# Patient Record
Sex: MALE | Race: WHITE | URBAN - METROPOLITAN AREA
[De-identification: names, ages, dates, MRNs, and addresses within clinical notes are randomized per-mention and may not be internally consistent; named-entity substitution may affect disease eponyms.]

---

## 2018-03-28 ENCOUNTER — RADIANT APPOINTMENT (OUTPATIENT)
Dept: GENERAL RADIOLOGY | Facility: CLINIC | Age: 23
End: 2018-03-28
Attending: INTERNAL MEDICINE
Payer: COMMERCIAL

## 2018-03-28 ENCOUNTER — OFFICE VISIT (OUTPATIENT)
Dept: URGENT CARE | Facility: URGENT CARE | Age: 23
End: 2018-03-28
Payer: COMMERCIAL

## 2018-03-28 ENCOUNTER — NURSE TRIAGE (OUTPATIENT)
Dept: NURSING | Facility: CLINIC | Age: 23
End: 2018-03-28

## 2018-03-28 VITALS
TEMPERATURE: 98.8 F | HEART RATE: 88 BPM | DIASTOLIC BLOOD PRESSURE: 76 MMHG | SYSTOLIC BLOOD PRESSURE: 114 MMHG | OXYGEN SATURATION: 99 %

## 2018-03-28 DIAGNOSIS — R07.89 ATYPICAL CHEST PAIN: ICD-10-CM

## 2018-03-28 DIAGNOSIS — R07.89 ATYPICAL CHEST PAIN: Primary | ICD-10-CM

## 2018-03-28 PROCEDURE — 99204 OFFICE O/P NEW MOD 45 MIN: CPT | Performed by: INTERNAL MEDICINE

## 2018-03-28 PROCEDURE — 93000 ELECTROCARDIOGRAM COMPLETE: CPT | Performed by: INTERNAL MEDICINE

## 2018-03-28 PROCEDURE — 71046 X-RAY EXAM CHEST 2 VIEWS: CPT

## 2018-03-28 RX ORDER — NAPROXEN 500 MG/1
500 TABLET ORAL 2 TIMES DAILY WITH MEALS
Qty: 60 TABLET | Refills: 0 | Status: SHIPPED | OUTPATIENT
Start: 2018-03-28

## 2018-03-28 ASSESSMENT — ENCOUNTER SYMPTOMS
SORE THROAT: 0
RHINORRHEA: 1
EYE REDNESS: 0
PALPITATIONS: 0
VOMITING: 0
SHORTNESS OF BREATH: 0
ARTHRALGIAS: 0
WHEEZING: 0
HEMATURIA: 0
CHILLS: 0
CHEST TIGHTNESS: 1
BRUISES/BLEEDS EASILY: 0
FEVER: 0
DIZZINESS: 0
EYE DISCHARGE: 0
HEADACHES: 1
DYSURIA: 0
ABDOMINAL PAIN: 0

## 2018-03-28 NOTE — TELEPHONE ENCOUNTER
"Clinic Action Needed:No  Reason for Call: \"I was told by my PCP back home in [place] to be seen at an Urgent Care before flying home tomorrow\".  Caller was looking for UC information, location and hours.  Info provided.   Routed to: Not routed.    Dee Sanchez RN  [facility]        "

## 2018-03-28 NOTE — MR AVS SNAPSHOT
After Visit Summary   3/28/2018    Den Rivera    MRN: [id number]           Patient Information     Date Of Birth          1995        Visit Information        Provider Department      3/28/2018 6:50 PM Munira Hylton MD [facility]        Today's Diagnoses     Atypical chest pain    -  1      Care Instructions      chest x-ray & EKG normal     Vitals, oxygen levels and exam are normal     Please start naprosyn 2 x day as may have constrochondritis  Also stress reduction  exercise    Plan recheck over next week with primary            *CHEST PAIN, NONCARDIAC    Based on your visit today, the exact cause of your chest pain is not certain. Your condition does not seem serious and your pain does not appear to be coming from your heart. However, sometimes the signs of a serious problem take more time to appear. Therefore, please watch for the warning signs listed below.  HOME CARE:  1. Rest today and avoid strenuous activity.  2. Take any prescribed medicine as directed.  FOLLOW UP with your doctor in 1-3 days.   GET PROMPT MEDICAL ATTENTION if any of the following occur:    A change in the type of pain: if it feels different, becomes more severe, lasts longer, or begins to spread into your shoulder, arm, neck, jaw or back    Shortness of breath or increased pain with breathing    Cough with blood or dark colored sputum (phlegm)    Weakness, dizziness, or fainting    Fever over 101  F (38.3  C)    Swelling, pain or redness in one leg    1656-5661 [employer]. [address] [place] [address]. All rights reserved. This information is not intended as a substitute for professional medical care. Always follow your healthcare professional's instructions.  This information has been modified by your health care provider with permission from the publisher.            Follow-ups after your visit        Who to contact     If you have questions or need  "follow up information about today's clinic visit or your schedule please contact [facility] directly at [phone].  Normal or non-critical lab and imaging results will be communicated to you by MyChart, letter or phone within 4 business days after the clinic has received the results. If you do not hear from us within 7 days, please contact the clinic through [employer] or phone. If you have a critical or abnormal lab result, we will notify you by phone as soon as possible.  Submit refill requests through [employer] or call your pharmacy and they will forward the refill request to us. Please allow 3 business days for your refill to be completed.          Additional Information About Your Visit        [employer]ave [employer] Information     [employer] lets you send messages to your doctor, view your test results, renew your prescriptions, schedule appointments and more. To sign up, go to [email or web address] . Click on \"Log in\" on the left side of the screen, which will take you to the Welcome page. Then click on \"Sign up Now\" on the right side of the page.     You will be asked to enter the access code listed below, as well as some personal information. Please follow the directions to create your username and password.     Your access code is: [email or web address]  Expires: 2018  8:50 PM     Your access code will  in 90 days. If you need help or a new code, please call your [place] clinic or [phone].        Care EveryWhere ID     This is your Care EveryWhere ID. This could be used by other organizations to access your [place] medical records  [id number]        Your Vitals Were     Pulse Temperature Pulse Oximetry             88 98.8  F (37.1  C) (Oral) 99%          Blood Pressure from Last 3 Encounters:   18 114/76    Weight from Last 3 Encounters:   No data found for Wt              We Performed the Following     EKG 12-lead complete w/read - Clinics          Today's Medication Changes        "   These changes are accurate as of 3/28/18  8:50 PM.  If you have any questions, ask your nurse or doctor.               Start taking these medicines.        Dose/Directions    naproxen 500 MG tablet   Commonly known as:  NAPROSYN   Used for:  Atypical chest pain   Started by:  Munira Hylton MD        Dose:  500 mg   Take 1 tablet (500 mg) by mouth 2 times daily (with meals)   Quantity:  60 tablet   Refills:  0            Where to get your medicines      Some of these will need a paper prescription and others can be bought over the counter.  Ask your nurse if you have questions.     Bring a paper prescription for each of these medications     naproxen 500 MG tablet                Primary Care Provider Fax #    Physician No Ref-Primary [phone]       No address on file        Equal Access to Services     VERÓNICA CODY : Kirill Adrian, wamayo fajardo, qaybta kaalmada adecherrieyanathan, gaby villarreal . [facility] [phone].    ATENCIÓN: Si habla español, tiene a lomeli disposición servicios gratuitos de asistencia lingüística. [facility] [phone].    We comply with applicable federal civil rights laws and [place] laws. We do not discriminate on the basis of race, color, national origin, age, disability, sex, sexual orientation, or gender identity.            Thank you!     Thank you for choosing [facility]  for your care. Our goal is always to provide you with excellent care. Hearing back from our patients is one way we can continue to improve our services. Please take a few minutes to complete the written survey that you may receive in the mail after your visit with us. Thank you!             Your Updated Medication List - Protect others around you: Learn how to safely use, store and throw away your medicines at www.disposemymeds.org.          This list is accurate as of 3/28/18  8:50 PM.  Always use your most recent med list.                    Brand Name Dispense Instructions for use Diagnosis    naproxen 500 MG tablet    NAPROSYN    60 tablet    Take 1 tablet (500 mg) by mouth 2 times daily (with meals)    Atypical chest pain       OMEPRAZOLE PO

## 2018-03-29 NOTE — PATIENT INSTRUCTIONS
chest x-ray & EKG normal     Vitals, oxygen levels and exam are normal     Please start naprosyn 2 x day as may have constrochondritis  Also stress reduction  exercise    Plan recheck over next week with primary            *CHEST PAIN, NONCARDIAC    Based on your visit today, the exact cause of your chest pain is not certain. Your condition does not seem serious and your pain does not appear to be coming from your heart. However, sometimes the signs of a serious problem take more time to appear. Therefore, please watch for the warning signs listed below.  HOME CARE:  1. Rest today and avoid strenuous activity.  2. Take any prescribed medicine as directed.  FOLLOW UP with your doctor in 1-3 days.   GET PROMPT MEDICAL ATTENTION if any of the following occur:    A change in the type of pain: if it feels different, becomes more severe, lasts longer, or begins to spread into your shoulder, arm, neck, jaw or back    Shortness of breath or increased pain with breathing    Cough with blood or dark colored sputum (phlegm)    Weakness, dizziness, or fainting    Fever over 101  F (38.3  C)    Swelling, pain or redness in one leg    7815-1905 [employer]. [address] [place] [id number]. All rights reserved. This information is not intended as a substitute for professional medical care. Always follow your healthcare professional's instructions.  This information has been modified by your health care provider with permission from the publisher.

## 2018-03-29 NOTE — NURSING NOTE
Chief Complaint   Patient presents with     Urgent Care     Triage     Earlier today while sitting down suddenly had very intense sharp pain in left anterior rib area, lasting a couple seconds, followed by less intense pain for the next few minutes.  Also felt some transient sharp pain under right ribs/RUQ of abdomen earlier.  Reports less intense pain (3 - 4/10) in both right and left rib areas has continued all day.       Triage     Denies nausea, sweating.  Did have some shortness of breath earlier while walking, now chest feels a bit tight.  States pain is not worse with breathing.  Denies any use of cocaine or other street drugs.     Triage     Reports has been worked up for chest pain in the past, was told it might be costochondritis, then was told it could be GERD-related; takes omeprazole daily.     Initial /76  Pulse 88  Temp 98.8  F (37.1  C) (Oral)  SpO2 99% There is no height or weight on file to calculate BMI..  BP completed using cuff size: nash Fermin RN

## 2018-03-29 NOTE — NURSING NOTE
Chief Complaint   Patient presents with     Urgent Care     Triage     Earlier today while sitting down suddenly had very intense sharp pain in left anterior rib area, lasting a couple seconds, followed by less intense pain for the next few minutes.  Also felt some transient sharp pain under right ribs/RUQ of abdomen earlier.  Reports less intense pain (3 - 4/10) in both right and left rib areas has continued all day.       Triage     Denies nausea, sweating.  Did have some shortness of breath earlier while walking, now chest feels a bit tight.  States pain is not worse with breathing.  Denies any use of cocaine or other street drugs.     Triage     Reports has been worked up for chest pain in the past, was told it might be costochondritis or GERD-related; takes omeprazole daily.     Initial /76  Pulse 88  Temp 98.8  F (37.1  C) (Oral)  SpO2 99% There is no height or weight on file to calculate BMI..  BP completed using cuff size: regular  UVALDO Fermin

## 2018-03-29 NOTE — NURSING NOTE
Chief Complaint   Patient presents with     Urgent Care     Triage     Earlier today was sitting down and suddenly had very intense sharp pain in left anterior rib area, lasting for a couple seconds, followed by less intense pain for the next few minutes.  Also had transient some sharp pain under right ribs/RUQ of abdomen.  Less intense pain (3 - 4/10) in both right and left rib areas has continued all day per pt.       Triage     Denies nausea, sweating.  Did have some shortness of breath earlier while walking, now chest feels a bit tight.  Denies any use of cocaine or other street drugs.     Triage     Reports has been worked up for chest pain in the past, at one point was told it might be costochondritis, then was told it could be GERD-related; takes omeprazole daily.     Initial /76  Pulse 88  Temp 98.8  F (37.1  C) (Oral)  SpO2 99% There is no height or weight on file to calculate BMI..  BP completed using cuff size: nash Fermin RN

## 2018-03-29 NOTE — NURSING NOTE
Chief Complaint   Patient presents with     Urgent Care     Triage     Earlier today while sitting down suddenly had very intense sharp pain in left anterior rib area, lasting for a couple seconds, followed by less intense pain for the next few minutes.  Also felt some transient sharp pain under right ribs/RUQ of abdomen earlier.  Less intense pain (3 - 4/10) in both right and left rib areas has continued all day per pt.       Triage     Denies nausea, sweating.  Did have some shortness of breath earlier while walking, now chest feels a bit tight.  States pain is not worse with breathing.  Denies any use of cocaine or other street drugs.     Triage     Reports has been worked up for chest pain in the past, was told it might be costochondritis, then was told it could be GERD-related; takes omeprazole daily.     Initial /76  Pulse 88  Temp 98.8  F (37.1  C) (Oral)  SpO2 99% There is no height or weight on file to calculate BMI..  BP completed using cuff size: nash Fermin RN